# Patient Record
Sex: MALE | Race: WHITE
[De-identification: names, ages, dates, MRNs, and addresses within clinical notes are randomized per-mention and may not be internally consistent; named-entity substitution may affect disease eponyms.]

---

## 2017-05-22 ENCOUNTER — HOSPITAL ENCOUNTER (EMERGENCY)
Dept: HOSPITAL 17 - NEPK | Age: 32
Discharge: HOME | End: 2017-05-22
Payer: COMMERCIAL

## 2017-05-22 VITALS
HEART RATE: 96 BPM | OXYGEN SATURATION: 99 % | RESPIRATION RATE: 17 BRPM | DIASTOLIC BLOOD PRESSURE: 83 MMHG | TEMPERATURE: 98 F | SYSTOLIC BLOOD PRESSURE: 137 MMHG

## 2017-05-22 VITALS — HEIGHT: 74 IN | WEIGHT: 165.35 LBS | BODY MASS INDEX: 21.22 KG/M2

## 2017-05-22 DIAGNOSIS — Z87.09: ICD-10-CM

## 2017-05-22 DIAGNOSIS — G89.29: ICD-10-CM

## 2017-05-22 DIAGNOSIS — Z86.59: ICD-10-CM

## 2017-05-22 DIAGNOSIS — M54.5: Primary | ICD-10-CM

## 2017-05-22 DIAGNOSIS — F17.200: ICD-10-CM

## 2017-05-22 DIAGNOSIS — Z87.39: ICD-10-CM

## 2017-05-22 PROCEDURE — 99283 EMERGENCY DEPT VISIT LOW MDM: CPT

## 2017-05-22 NOTE — PD
HPI


Chief Complaint:  Back/ Neck Pain or Injury


Time Seen by Provider:  09:50


Travel History


International Travel<30 days:  No


Contact w/Intl Traveler<30days:  No


Traveled to known affect area:  No





History of Present Illness


HPI


31-year-old male with past medical history chronic back pain presents the ER 

with worsening of his chronic back pain for the last 2 days.  He denies injury 

or ambulatory dysfunction. Patient reports that he has lost his insurance and 

is unable to see his primary care provider who prescribed him pain medicine.  

He reports generalized mid back/low pain that is constant and nonradiating, 

worse with movement.  Denies any fever, chest pain, shortness of breath, nausea

, vomiting, abdominal pain, urinary complaints, incontinence, numbness or 

weakness.





PFSH


Past Medical History


Asthma:  Yes


Depression:  Yes


Musculoskeletal:  Yes (chronic back pain)


Respiratory:  Yes (ASTHMA)


Immunizations Current:  Yes





Social History


Alcohol Use:  Yes (beer)


Tobacco Use:  Yes (1ppd)


Substance Use:  Yes





Allergies-Medications


(Allergen,Severity, Reaction):  


Coded Allergies:  


     Toradol (Verified  Adverse Reaction, Unknown, 5/22/17)


Reported Meds & Prescriptions





Reported Meds & Active Scripts


Active


Reported


Ibuprofen 200 Mg Cap 200 Mg PO Q6H PRN








Review of Systems


Except as stated in HPI:  all other systems reviewed are Neg





Physical Exam


Narrative


GENERAL: Well-nourished well-appearing white male


SKIN: Warm and dry.


HEAD: Atraumatic. Normocephalic. 


EYES: Pupils equal and round. No scleral icterus. No injection or drainage. 


ENT: No nasal bleeding or discharge.  Mucous membranes pink and moist.


NECK: Trachea midline. No JVD. 


CARDIOVASCULAR: Regular rate and rhythm.  


RESPIRATORY: No accessory muscle use. Clear to auscultation. Breath sounds 

equal bilaterally. 


GASTROINTESTINAL: Abdomen soft, non-tender, nondistended. Hepatic and splenic 

margins not palpable. 


MUSCULOSKELETAL: Extremities without clubbing, cyanosis, or edema. No obvious 

deformities. 


NEUROLOGICAL: Awake and alert. No obvious cranial nerve deficits.  Motor 

grossly within normal limits. Five out of 5 muscle strength in the arms and 

legs.  Normal speech.  2+ reflexes in lower cavities.  Normal sensation in 

lower extremities.  Generalized mid to low back pain.  No midline spinal point 

tenderness.  Ambulatory without difficulty.


PSYCHIATRIC: Appropriate mood and affect; insight and judgment normal.





Data


Data


Last Documented VS





Vital Signs








  Date Time  Temp Pulse Resp B/P Pulse Ox O2 Delivery O2 Flow Rate FiO2


 


5/22/17 09:02 98.0 96 17 137/83 99   











MDM


Medical Decision Making


Medical Screen Exam Complete:  Yes


Emergency Medical Condition:  No


Differential Diagnosis


Chronic back pain versus sciatica versus lumbar strain versus herniated disc


Narrative Course


31-year-old male presents emergency Department with past medical history 

chronic back pain reports pain is worse over the last 2 days.  He reports that 

he has lost his insurance and unable to follow up with his primary care doctor 

who  has prescribed pain medicine in the past for his chronic back pain.  He 

denies fevers, chills, ambulatory dysfunction, paresthesia or incontinence.  

Patient reports that he is able to take Motrin despite being allergic to 

Toradol.  Plan will be to discharge patient home on NSAIDs muscle relaxers 

follow up with [facility].





Diagnosis





 Primary Impression:  


 Chronic back pain


 Qualified Code:  M54.5 - Chronic low back pain without sciatica, unspecified 

back pain laterality


Referrals:  


Primary Care Physician


Scripts


Methocarbamol (Robaxin)500 Mg [id number] Mg PO TID PRN (MUSCLE SPASM) #12 TAB


   Prov:Susu Leung         5/22/17 


Ibuprofen (Motrin Ib)200 Mg [id number] Mg PO Q8HR PRN (PAIN SCALE 1 TO 5) #20 TAB


   Prov:Susu Leung         5/22/17


Disposition:  01 DISCHARGE HOME


Condition:  Stable








Susu Leung May 22, 2017 10:19

## 2017-07-09 ENCOUNTER — HOSPITAL ENCOUNTER (EMERGENCY)
Dept: HOSPITAL 17 - NEPD | Age: 32
LOS: 1 days | Discharge: HOME | End: 2017-07-10
Payer: COMMERCIAL

## 2017-07-09 VITALS — WEIGHT: 149.91 LBS | BODY MASS INDEX: 23.53 KG/M2 | HEIGHT: 67 IN

## 2017-07-09 VITALS
OXYGEN SATURATION: 98 % | DIASTOLIC BLOOD PRESSURE: 73 MMHG | SYSTOLIC BLOOD PRESSURE: 134 MMHG | TEMPERATURE: 98.3 F | RESPIRATION RATE: 22 BRPM | HEART RATE: 76 BPM

## 2017-07-09 DIAGNOSIS — Z79.899: ICD-10-CM

## 2017-07-09 DIAGNOSIS — F17.200: ICD-10-CM

## 2017-07-09 DIAGNOSIS — F32.9: ICD-10-CM

## 2017-07-09 DIAGNOSIS — J45.909: Primary | ICD-10-CM

## 2017-07-09 PROCEDURE — 99284 EMERGENCY DEPT VISIT MOD MDM: CPT

## 2017-07-10 NOTE — PD
HPI


Chief Complaint:  Cold / Flu Symptoms


Time Seen by Provider:  00:19


Travel History


International Travel<30 days:  No


Contact w/Intl Traveler<30days:  No


Traveled to known affect area:  No





History of Present Illness


HPI


31-year-old white male presents to emergency department from the [facility].  The patient states that he has a history of asthma but has 

not needed his medicines some time.  He has been in the facility now for a 

month a feels that there is some mold issues going on and it is aggravating his 

breathing.  He states that he has cough, congestion and shortness of breath at 

times.  He does state he has some wheezing.  He denies any fever chills.  No 

nausea vomiting.  No bowel pain or urinary symptoms.  He states that he is 

working with his  for of motion to be discharged from the facility.





[facility]


Past Medical History


*** Narrative Medical


Asthma, depression, methamphetamine abuse


Asthma:  Yes


Depression:  Yes


Musculoskeletal:  Yes (chronic back pain)


Respiratory:  Yes (ASTHMA)


Immunizations Current:  Yes


Tetanus Vaccination:  < 5 Years





Past Surgical History


Surgical History:  No Previous Surgery





Social History


Alcohol Use:  Yes (beer)


Tobacco Use:  Yes (1ppd)


Substance Use:  Yes





Allergies-Medications


(Allergen,Severity, Reaction):  


Coded Allergies:  


     Toradol (Verified  Adverse Reaction, Unknown, 7/10/17)


Reported Meds & Prescriptions





Reported Meds & Active Scripts


Active


Deltasone (Prednisone) 20 Mg Tab 20 Mg PO BID


Proventil Hfa 6.7 GM Inh (Albuterol Sulfate) 90 Mcg/Act Aer 2 Puff INH Q6H PRN








Review of Systems


Except as stated in HPI:  all other systems reviewed are Neg





Physical Exam


Narrative


GENERAL:  Well-developed, well-nourished in no acute distress. Nontoxic 

appearing.


HEAD: Normocephalic, atraumatic.


EYES: Pupils equal round and reactive. Extraocular motions intact. No scleral 

icterus. No injection or drainage. 


ENT: TMs clear without erythema.  The external auditory canals clear.  Nose: 

clear .  Posterior pharynx is pink and moist.  No tonsillar edema or exudate.  

Uvula midline. Airway patent.


NECK: Trachea midline.Supple, nontender, moves head freely.  No central bony 

tenderness or spasm.


CARDIOVASCULAR: Regular rate and rhythm without murmurs, gallops, or rubs. 


RESPIRATORY: Clear to auscultation. Breath sounds equal bilaterally. No wheezes

, rales, or rhonchi.  


GASTROINTESTINAL: Abdomen soft, non-tender, nondistended. No hepato-splenomegaly

, or palpable masses. No guarding.


EXTREMITIES: No clubbing, cyanosis, or edema. No joint tenderness, effusion, or 

edema noted. 


BACK: Nontender without deformity or crepitance. No flank tenderness.





Data


Data


Last Documented VS





Vital Signs








  Date Time  Temp Pulse Resp B/P Pulse Ox O2 Delivery O2 Flow Rate FiO2


 


7/9/17 23:50 98.3 76 22 134/73 98   











MDM


Medical Decision Making


Medical Screen Exam Complete:  Yes


Emergency Medical Condition:  Yes


Medical Record Reviewed:  Yes


Differential Diagnosis


MDM: High


Differential diagnoses: Pneumonia, bronchitis, URI, asthma, RAD, Mold


Narrative Course


The patient's exam is unremarkable.  He'll be given the benefit of the doubt 

and given a prescription for steroids and albuterol.





This is asthma





Diagnosis





 Primary Impression:  


 asthma


Patient Instructions:  General Instructions





***Additional Instructions:


Rest.


Increase fluids.


Tylenol and Advil.


Robitussin-DM.


prednisone, and albuterol.


Followup with your Dr. in one week.


Return to the ER for any problems.


***Med/Other Pt SpecificInfo:  Prescription(s) given


Scripts


Prednisone (Deltasone)20 Mg Tab20 Mg PO BID  #10 TAB


   Prov:Nasim Ye MD         7/10/17 


Albuterol 6.7 GM Inh (Proventil Hfa 6.7 GM Inh)90 Mcg/Act Aer2 Puff INH Q6H PRN 

(SHORTNESS OF BREATH) #1 INHALER


   Prov:Nasim Ye MD         7/10/17


Disposition:  01 DISCHARGE HOME


Condition:  Stable








Ru Price Jul 10, 2017 00:22

## 2018-01-17 ENCOUNTER — HOSPITAL ENCOUNTER (EMERGENCY)
Dept: HOSPITAL 17 - NEPK | Age: 33
Discharge: HOME | End: 2018-01-17
Payer: SELF-PAY

## 2018-01-17 VITALS — HEIGHT: 74 IN | WEIGHT: 175.38 LBS | BODY MASS INDEX: 22.51 KG/M2

## 2018-01-17 VITALS
TEMPERATURE: 98.2 F | DIASTOLIC BLOOD PRESSURE: 81 MMHG | SYSTOLIC BLOOD PRESSURE: 143 MMHG | HEART RATE: 89 BPM | RESPIRATION RATE: 14 BRPM | OXYGEN SATURATION: 95 %

## 2018-01-17 DIAGNOSIS — F17.200: ICD-10-CM

## 2018-01-17 DIAGNOSIS — R09.81: ICD-10-CM

## 2018-01-17 DIAGNOSIS — F32.9: ICD-10-CM

## 2018-01-17 DIAGNOSIS — J32.9: ICD-10-CM

## 2018-01-17 DIAGNOSIS — J45.909: ICD-10-CM

## 2018-01-17 DIAGNOSIS — Z76.0: Primary | ICD-10-CM

## 2018-01-17 PROCEDURE — 99284 EMERGENCY DEPT VISIT MOD MDM: CPT

## 2018-01-17 NOTE — PD
HPI


Chief Complaint:  Cold / Flu Symptoms


Time Seen by Provider:  14:32


Travel History


International Travel<30 days:  No


Contact w/Intl Traveler<30days:  No


Traveled to known affect area:  No





History of Present Illness


HPI


32-year-old male presents to emergency Department with complaint of left-sided 

sinus pressure.  He has been sick for the past 2 weeks and has now developed 

the left-sided sinus pressure.  Reports subjective fevers on and off for the 

past 2 weeks.  Reports left ear pressure.  Denies sore throat or cough.  

Reports nasal congestion.  Denies vomiting, shortness of breath, chest tightness

, wheezing.  Has history of asthma.  States he needs a refill on his inhaler.  

Been taking NyQuil for symptomatic management.  Symptoms are mild in severity.  

No known relieving or aggravating factors.  Allergies to Toradol.  No primary 

care provider.  Has no other medical complaints.  No other modifying factors or 

associated signs and symptoms.





PFSH


Past Medical History


Asthma:  Yes


Depression:  Yes


Musculoskeletal:  Yes (chronic back pain)


Respiratory:  Yes (asthma)


Immunizations Current:  Yes


Tetanus Vaccination:  Unknown





Past Surgical History


Surgical History:  No Previous Surgery





Social History


Alcohol Use:  Yes (beer)


Tobacco Use:  Yes (1ppd)


Substance Use:  No (denies)





Allergies-Medications


(Allergen,Severity, Reaction):  


Coded Allergies:  


     ketorolac (Unverified  Adverse Reaction, Unknown, 1/17/18)


Reported Meds & Prescriptions





Reported Meds & Active Scripts


Active


Claritin (Loratadine) 10 Mg Cap 10 Mg PO DAILY 10 Days


Ventolin Hfa 18 GM Inh (Albuterol Sulfate) 90 Mcg/Act Aer 2 Puff INH Q4-6H PRN


Amoxicillin 500 Mg Cap 500 Mg PO BID 10 Days








Review of Systems


Except as stated in HPI:  all other systems reviewed are Neg





Physical Exam


Narrative


GENERAL: Well-nourished, well-developed  male patient, in no acute 

distress


SKIN: Warm and dry.  No rash.


HEAD: Atraumatic. Normocephalic.  Left Frontal and maxillary sinus tenderness 

on palpation.  


EYES: Pupils equal and round at 3 mm with brisk reaction. No scleral icterus. 

No injection or drainage.  PERRLA. 


ENT: Mucosa pink and moist.  Oropharynx without erythema, exudates, tonsillar 

edema.. No uvular edema. No uvular, palatal, or tonsillar deviation.  Airway 

patent.  


EARS: Bilateral pinnae and external canals appear within normal limits.  

Bilateral tympanic membranes without  erythema, dullness or perforation. 


NECK: Trachea midline.  No lymphadenopathy.  


CARDIOVASCULAR: Regular rate and rhythm.  No murmur appreciated.  


RESPIRATORY: No accessory muscle use. Clear to auscultation. Breath sounds 

equal bilaterally. 


GASTROINTESTINAL: Flat.


MUSCULOSKELETAL: No obvious deformities. No clubbing.  No cyanosis.  No edema. 


NEUROLOGICAL: Awake and alert.  Oriented 3.  No obvious cranial nerve 

deficits.  Motor grossly within normal limits. Normal speech.  Moves all 

extremities.  5/5 strength to all extremities.


PSYCHIATRIC: Appropriate mood and affect; insight and judgment normal.





Data


Data


Last Documented VS





Vital Signs








  Date Time  Temp Pulse Resp B/P (MAP) Pulse Ox O2 Delivery O2 Flow Rate FiO2


 


1/17/18 15:11        


 


1/17/18 12:29 98.2 89 14  95   








Orders





 Orders


Ed Discharge Order (1/17/18 14:52)








MDM


Medical Decision Making


Medical Screen Exam Complete:  Yes


Emergency Medical Condition:  Yes


Medical Record Reviewed:  Yes


Differential Diagnosis


Sinusitis, allergic rhinitis, upper respiratory infection, medication refill


Narrative Course


32-year-old male physical examination consistent with sinusitis.  He also needs 

a refill on his albuterol inhaler for asthma.  He isn't sick for 2 weeks with 

onset of left-sided sinus pressure.  Afebrile nontoxic appearing.  Denies fever

, vomiting.  Amoxicillin, Claritin, Ventolin inhaler prescribed for home.  

Instructed patient to follow up with primary care provider.  Patient verbalizes 

understanding and agreement with treatment plan.  Patient is medically cleared 

and stable for discharge.  Discussed reasons to return to the emergency 

department.  Patient agrees with treatment plan.  The patients vital signs are 

stable and the patient is stable for outpatient follow-up and treatment.  

Patient discharged home, stable and in no acute distress.





Diagnosis





 Primary Impression:  


 Sinusitis


 Qualified Codes:  J32.9 - Chronic sinusitis, unspecified


 Additional Impression:  


 Medication refill


Referrals:  


[facility]





Primary Care Physician


Patient Instructions:  General Instructions, Sinusitis (ED)





***Additional Instructions:  


Antibiotics as prescribed and complete full course


Ibuprofen or Tylenol as instructed and as needed for fever/pain


Over-the-counter cough and cold medications as directed and as needed for 

symptom management


Get plenty of sleep/rest


Drink plenty of fluids to prevent dehydration; popsicles and Gatorade


Use an air humidifier/turn off ceiling fans


Follow-up with primary care provider


Return immediately to the emergency department with worsening of symptoms


***Med/Other Pt SpecificInfo:  Prescription(s) given


Scripts


Loratadine (Claritin) 10 Mg Cap


10 MG PO DAILY for Allergy Management for 10 Days, #10 CAP 0 Refills


   Prov: Little AlvaradoP         1/17/18 


Albuterol 18 GM Inh (Ventolin Hfa 18 GM Inh) 90 Mcg/Act Aer


2 PUFF INH Q4-6H Y for SOB/WHEEZING, #1 INHALER 0 Refills


   Prov: Little AlvaradoP         1/17/18 


Amoxicillin (Amoxicillin) 500 Mg Cap


500 MG PO BID for Infection for 10 Days, #20 CAP 0 Refills


   Prov: Little AlvaradoP         1/17/18


Disposition:  01 DISCHARGE HOME


Condition:  Stable











Little Alvarado Jan 17, 2018 14:51